# Patient Record
Sex: FEMALE | Race: WHITE | NOT HISPANIC OR LATINO | Employment: OTHER | URBAN - METROPOLITAN AREA
[De-identification: names, ages, dates, MRNs, and addresses within clinical notes are randomized per-mention and may not be internally consistent; named-entity substitution may affect disease eponyms.]

---

## 2024-01-17 DIAGNOSIS — F10.21 CHRONIC ALCOHOLISM IN REMISSION (MULTI): ICD-10-CM

## 2024-01-17 RX ORDER — BUPROPION HYDROCHLORIDE 300 MG/1
300 TABLET ORAL DAILY
Qty: 90 TABLET | Refills: 0 | Status: SHIPPED | OUTPATIENT
Start: 2024-01-17 | End: 2024-04-16

## 2024-02-07 ENCOUNTER — TELEPHONE (OUTPATIENT)
Dept: PRIMARY CARE | Facility: CLINIC | Age: 69
End: 2024-02-07
Payer: MEDICARE

## 2024-02-07 DIAGNOSIS — N95.1 MENOPAUSAL SYMPTOM: ICD-10-CM

## 2024-02-07 PROBLEM — F32.A DEPRESSION: Status: ACTIVE | Noted: 2024-02-07

## 2024-02-07 PROBLEM — R79.89 ABNORMAL THYROID BLOOD TEST: Status: ACTIVE | Noted: 2021-09-10

## 2024-02-07 PROBLEM — R79.89 ABNORMAL TSH: Status: ACTIVE | Noted: 2023-05-17

## 2024-02-07 PROBLEM — M62.838 MUSCLE SPASM: Status: ACTIVE | Noted: 2019-02-28

## 2024-02-07 PROBLEM — E55.9 VITAMIN D DEFICIENCY: Status: ACTIVE | Noted: 2024-02-07

## 2024-02-07 RX ORDER — ESTRADIOL 0.04 MG/D
FILM, EXTENDED RELEASE TRANSDERMAL
COMMUNITY
Start: 2013-03-15 | End: 2024-02-07 | Stop reason: SDUPTHER

## 2024-02-07 NOTE — TELEPHONE ENCOUNTER
Rx Refill Request Telephone Encounter    Name:  Cynthia Garza Susan  :  [id number]  Medication Name:  Estradiol patch             Specific Pharmacy location:  [facility], [place]  Date of last appointment:    Date of next appointment:    Best number to reach patient:

## 2024-02-08 RX ORDER — ESTRADIOL 0.04 MG/D
FILM, EXTENDED RELEASE TRANSDERMAL
Qty: 8 PATCH | Refills: 3 | Status: SHIPPED | OUTPATIENT
Start: 2024-02-08 | End: 2024-04-23 | Stop reason: SDUPTHER

## 2024-04-13 DIAGNOSIS — F10.21 CHRONIC ALCOHOLISM IN REMISSION (MULTI): ICD-10-CM

## 2024-04-16 RX ORDER — BUPROPION HYDROCHLORIDE 300 MG/1
300 TABLET ORAL DAILY
Qty: 90 TABLET | Refills: 0 | Status: SHIPPED | OUTPATIENT
Start: 2024-04-16 | End: 2024-05-14 | Stop reason: SDUPTHER

## 2024-04-23 ENCOUNTER — TELEPHONE (OUTPATIENT)
Dept: PRIMARY CARE | Facility: CLINIC | Age: 69
End: 2024-04-23

## 2024-04-23 ENCOUNTER — OFFICE VISIT (OUTPATIENT)
Dept: PRIMARY CARE | Facility: CLINIC | Age: 69
End: 2024-04-23
Payer: MEDICARE

## 2024-04-23 VITALS
HEIGHT: 65 IN | BODY MASS INDEX: 25.33 KG/M2 | DIASTOLIC BLOOD PRESSURE: 68 MMHG | OXYGEN SATURATION: 98 % | SYSTOLIC BLOOD PRESSURE: 112 MMHG | WEIGHT: 152 LBS | HEART RATE: 70 BPM

## 2024-04-23 DIAGNOSIS — Z78.0 POSTMENOPAUSAL STATUS: ICD-10-CM

## 2024-04-23 DIAGNOSIS — Z00.00 ROUTINE GENERAL MEDICAL EXAMINATION AT HEALTH CARE FACILITY: Primary | ICD-10-CM

## 2024-04-23 DIAGNOSIS — F32.A DEPRESSION, UNSPECIFIED DEPRESSION TYPE: ICD-10-CM

## 2024-04-23 DIAGNOSIS — Z00.00 ANNUAL PHYSICAL EXAM: ICD-10-CM

## 2024-04-23 DIAGNOSIS — R79.89 ABNORMAL THYROID BLOOD TEST: ICD-10-CM

## 2024-04-23 DIAGNOSIS — R53.83 OTHER FATIGUE: ICD-10-CM

## 2024-04-23 DIAGNOSIS — Z12.31 ENCOUNTER FOR SCREENING MAMMOGRAM FOR BREAST CANCER: ICD-10-CM

## 2024-04-23 DIAGNOSIS — Z00.00 ANNUAL PHYSICAL EXAM: Primary | ICD-10-CM

## 2024-04-23 DIAGNOSIS — R79.89 ABNORMAL TSH: ICD-10-CM

## 2024-04-23 DIAGNOSIS — N95.1 MENOPAUSAL SYMPTOM: ICD-10-CM

## 2024-04-23 PROCEDURE — 1126F AMNT PAIN NOTED NONE PRSNT: CPT | Performed by: PHYSICIAN ASSISTANT

## 2024-04-23 PROCEDURE — 1160F RVW MEDS BY RX/DR IN RCRD: CPT | Performed by: PHYSICIAN ASSISTANT

## 2024-04-23 PROCEDURE — 1159F MED LIST DOCD IN RCRD: CPT | Performed by: PHYSICIAN ASSISTANT

## 2024-04-23 PROCEDURE — 1170F FXNL STATUS ASSESSED: CPT | Performed by: PHYSICIAN ASSISTANT

## 2024-04-23 PROCEDURE — G0439 PPPS, SUBSEQ VISIT: HCPCS | Performed by: PHYSICIAN ASSISTANT

## 2024-04-23 RX ORDER — ESTRADIOL 0.04 MG/D
FILM, EXTENDED RELEASE TRANSDERMAL
Qty: 8 PATCH | Refills: 3 | Status: SHIPPED | OUTPATIENT
Start: 2024-04-23

## 2024-04-23 RX ORDER — ESTRADIOL 0.04 MG/D
FILM, EXTENDED RELEASE TRANSDERMAL
Qty: 8 PATCH | Refills: 3 | Status: SHIPPED | OUTPATIENT
Start: 2024-04-23 | End: 2024-04-23 | Stop reason: SDUPTHER

## 2024-04-23 RX ORDER — ERGOCALCIFEROL 1.25 MG/1
CAPSULE ORAL
COMMUNITY
Start: 2023-01-11

## 2024-04-23 ASSESSMENT — PATIENT HEALTH QUESTIONNAIRE - PHQ9
SUM OF ALL RESPONSES TO PHQ9 QUESTIONS 1 AND 2: 0
1. LITTLE INTEREST OR PLEASURE IN DOING THINGS: NOT AT ALL
2. FEELING DOWN, DEPRESSED OR HOPELESS: NOT AT ALL

## 2024-04-23 ASSESSMENT — ACTIVITIES OF DAILY LIVING (ADL)
BATHING: INDEPENDENT
DRESSING: INDEPENDENT
DOING_HOUSEWORK: INDEPENDENT
TAKING_MEDICATION: INDEPENDENT
GROCERY_SHOPPING: INDEPENDENT
MANAGING_FINANCES: INDEPENDENT

## 2024-04-23 ASSESSMENT — ENCOUNTER SYMPTOMS
OCCASIONAL FEELINGS OF UNSTEADINESS: 0
DEPRESSION: 0
LOSS OF SENSATION IN FEET: 0

## 2024-04-23 ASSESSMENT — PAIN SCALES - GENERAL: PAINLEVEL: 0-NO PAIN

## 2024-04-23 NOTE — PROGRESS NOTES
"Subjective   Reason for Visit: Cynthia Aranza Davis is an 68 y.o. female here for a Medicare Wellness visit.     Past Medical, Surgical, and Family History reviewed and updated in chart.         Depression  Visit Type: follow-up  Patient presents with the following symptoms: depressed mood, excessive worry and restlessness.  Patient is not experiencing: nervousness/anxiety, palpitations and shortness of breath.      Worried about her grandson who graduated from high school and is not always making good decisions.    Patient Care Team:  Cynthia Garcia PA-C as PCP - General (Family Medicine)     Review of Systems   Constitutional:  Negative for activity change and appetite change.   HENT:  Negative for dental problem.    Eyes:  Negative for visual disturbance.   Respiratory:  Negative for chest tightness, shortness of breath and wheezing.    Cardiovascular:  Negative for chest pain, palpitations and leg swelling.   Gastrointestinal:  Negative for abdominal pain, blood in stool, constipation, diarrhea and nausea.   Endocrine: Negative for cold intolerance and heat intolerance.   Genitourinary:  Negative for frequency and urgency.   Musculoskeletal:  Negative for arthralgias and myalgias.   Skin:  Negative for color change.   Allergic/Immunologic: Negative for immunocompromised state.   Neurological:  Negative for dizziness, tremors and light-headedness.   Psychiatric/Behavioral:  Positive for behavioral problems and depression. Negative for sleep disturbance. The patient is not nervous/anxious.        Objective   Vitals:  /68 (BP Location: Left arm, Patient Position: Sitting)   Pulse 70   Ht 1.651 m (5' 5\")   Wt 68.9 kg (152 lb)   SpO2 98%   BMI 25.29 kg/m²       Physical Exam  HENT:      Right Ear: Tympanic membrane normal.      Left Ear: Tympanic membrane normal.      Nose: Nose normal.      Mouth/Throat:      Mouth: Mucous membranes are moist.   Cardiovascular:      Rate and Rhythm: Normal " rate and regular rhythm.      Pulses: Normal pulses.   Pulmonary:      Effort: Pulmonary effort is normal. No respiratory distress.   Abdominal:      General: Bowel sounds are normal.      Tenderness: There is no abdominal tenderness.   Musculoskeletal:      Cervical back: Normal range of motion. No tenderness.      Right lower leg: No edema.      Left lower leg: No edema.   Skin:     General: Skin is warm.   Neurological:      General: No focal deficit present.      Mental Status: She is alert. Mental status is at baseline.   Psychiatric:         Mood and Affect: Mood normal.         Thought Content: Thought content normal.         Judgment: Judgment normal.         Assessment/Plan   Problem List Items Addressed This Visit       Menopausal symptom    Relevant Medications    estradiol (Vivelle-DOT) 0.0375 mg/24 hr     Other Visit Diagnoses       Routine general medical examination at health care facility    -  Primary    Postmenopausal status        Relevant Medications    estradiol (Vivelle-DOT) 0.0375 mg/24 hr    Other Relevant Orders    XR DEXA bone density    Encounter for screening mammogram for breast cancer        Relevant Orders    BI mammo bilateral screening tomosynthesis    Annual physical exam        Relevant Orders    CBC and Auto Differential    Comprehensive Metabolic Panel    Lipid Panel    TSH with reflex to Free T4 if abnormal    Urinalysis with Reflex Microscopic    Other fatigue        Relevant Orders    CBC and Auto Differential        Discussed eventually getting off the estradiol patch. She will try to decrease use.

## 2024-04-23 NOTE — TELEPHONE ENCOUNTER
Patient would like a paper copy of the prescriptions she was getting from today's appt.  She would like to be called when they are ready.  [phone]

## 2024-04-28 ASSESSMENT — ENCOUNTER SYMPTOMS
RESTLESSNESS: 1
PALPITATIONS: 0
COLOR CHANGE: 0
ACTIVITY CHANGE: 0
LIGHT-HEADEDNESS: 0
DIARRHEA: 0
ARTHRALGIAS: 0
CHEST TIGHTNESS: 0
DIZZINESS: 0
CONSTIPATION: 0
FREQUENCY: 0
NERVOUS/ANXIOUS: 0
SHORTNESS OF BREATH: 0
ABDOMINAL PAIN: 0
TREMORS: 0
DEPRESSION: 1
BLOOD IN STOOL: 0
DEPRESSED MOOD: 1
SLEEP DISTURBANCE: 0
NAUSEA: 0
APPETITE CHANGE: 0
WHEEZING: 0
MYALGIAS: 0

## 2024-04-29 ENCOUNTER — HOSPITAL ENCOUNTER (OUTPATIENT)
Dept: RADIOLOGY | Facility: CLINIC | Age: 69
Discharge: HOME | End: 2024-04-29
Payer: MEDICARE

## 2024-04-29 ENCOUNTER — LAB (OUTPATIENT)
Dept: LAB | Facility: LAB | Age: 69
End: 2024-04-29
Payer: MEDICARE

## 2024-04-29 VITALS — BODY MASS INDEX: 24.11 KG/M2 | WEIGHT: 150 LBS | HEIGHT: 66 IN

## 2024-04-29 DIAGNOSIS — R53.83 OTHER FATIGUE: ICD-10-CM

## 2024-04-29 DIAGNOSIS — R79.89 ABNORMAL TSH: ICD-10-CM

## 2024-04-29 DIAGNOSIS — Z78.0 POSTMENOPAUSAL STATUS: ICD-10-CM

## 2024-04-29 DIAGNOSIS — Z12.31 ENCOUNTER FOR SCREENING MAMMOGRAM FOR BREAST CANCER: ICD-10-CM

## 2024-04-29 DIAGNOSIS — F32.A DEPRESSION, UNSPECIFIED DEPRESSION TYPE: ICD-10-CM

## 2024-04-29 DIAGNOSIS — Z00.00 ANNUAL PHYSICAL EXAM: ICD-10-CM

## 2024-04-29 LAB
ALBUMIN SERPL-MCNC: 4.3 G/DL (ref 3.5–5)
ALP BLD-CCNC: 66 U/L (ref 35–125)
ALT SERPL-CCNC: 11 U/L (ref 5–40)
ANION GAP SERPL CALC-SCNC: 10 MMOL/L
APPEARANCE UR: ABNORMAL
AST SERPL-CCNC: 14 U/L (ref 5–40)
BASOPHILS # BLD AUTO: 0.04 X10*3/UL (ref 0–0.1)
BASOPHILS NFR BLD AUTO: 0.7 %
BILIRUB SERPL-MCNC: 0.3 MG/DL (ref 0.1–1.2)
BILIRUB UR STRIP.AUTO-MCNC: NEGATIVE MG/DL
BUN SERPL-MCNC: 17 MG/DL (ref 8–25)
CALCIUM SERPL-MCNC: 8.8 MG/DL (ref 8.5–10.4)
CHLORIDE SERPL-SCNC: 106 MMOL/L (ref 97–107)
CHOLEST SERPL-MCNC: 225 MG/DL (ref 133–200)
CHOLEST/HDLC SERPL: 3.3 {RATIO}
CO2 SERPL-SCNC: 27 MMOL/L (ref 24–31)
COLOR UR: ABNORMAL
CREAT SERPL-MCNC: 0.8 MG/DL (ref 0.4–1.6)
EGFRCR SERPLBLD CKD-EPI 2021: 81 ML/MIN/1.73M*2
EOSINOPHIL # BLD AUTO: 0.09 X10*3/UL (ref 0–0.7)
EOSINOPHIL NFR BLD AUTO: 1.6 %
ERYTHROCYTE [DISTWIDTH] IN BLOOD BY AUTOMATED COUNT: 13.1 % (ref 11.5–14.5)
GLUCOSE SERPL-MCNC: 95 MG/DL (ref 65–99)
GLUCOSE UR STRIP.AUTO-MCNC: NORMAL MG/DL
HCT VFR BLD AUTO: 40.8 % (ref 36–46)
HDLC SERPL-MCNC: 68 MG/DL
HGB BLD-MCNC: 12.9 G/DL (ref 12–16)
IMM GRANULOCYTES # BLD AUTO: 0.01 X10*3/UL (ref 0–0.7)
IMM GRANULOCYTES NFR BLD AUTO: 0.2 % (ref 0–0.9)
KETONES UR STRIP.AUTO-MCNC: NEGATIVE MG/DL
LDLC SERPL CALC-MCNC: 142 MG/DL (ref 65–130)
LEUKOCYTE ESTERASE UR QL STRIP.AUTO: NEGATIVE
LYMPHOCYTES # BLD AUTO: 2.47 X10*3/UL (ref 1.2–4.8)
LYMPHOCYTES NFR BLD AUTO: 44.2 %
MCH RBC QN AUTO: 31.5 PG (ref 26–34)
MCHC RBC AUTO-ENTMCNC: 31.6 G/DL (ref 32–36)
MCV RBC AUTO: 100 FL (ref 80–100)
MONOCYTES # BLD AUTO: 0.44 X10*3/UL (ref 0.1–1)
MONOCYTES NFR BLD AUTO: 7.9 %
NEUTROPHILS # BLD AUTO: 2.54 X10*3/UL (ref 1.2–7.7)
NEUTROPHILS NFR BLD AUTO: 45.4 %
NITRITE UR QL STRIP.AUTO: NEGATIVE
NRBC BLD-RTO: 0 /100 WBCS (ref 0–0)
PH UR STRIP.AUTO: 5.5 [PH]
PLATELET # BLD AUTO: 198 X10*3/UL (ref 150–450)
POTASSIUM SERPL-SCNC: 4 MMOL/L (ref 3.4–5.1)
PROT SERPL-MCNC: 6.3 G/DL (ref 5.9–7.9)
PROT UR STRIP.AUTO-MCNC: NEGATIVE MG/DL
RBC # BLD AUTO: 4.1 X10*6/UL (ref 4–5.2)
RBC # UR STRIP.AUTO: NEGATIVE /UL
SODIUM SERPL-SCNC: 143 MMOL/L (ref 133–145)
SP GR UR STRIP.AUTO: 1.02
T4 FREE SERPL-MCNC: 0.9 NG/DL (ref 0.9–1.7)
TRIGL SERPL-MCNC: 75 MG/DL (ref 40–150)
TSH SERPL DL<=0.05 MIU/L-ACNC: 4.73 MIU/L (ref 0.27–4.2)
UROBILINOGEN UR STRIP.AUTO-MCNC: NORMAL MG/DL
WBC # BLD AUTO: 5.6 X10*3/UL (ref 4.4–11.3)

## 2024-04-29 PROCEDURE — 77067 SCR MAMMO BI INCL CAD: CPT

## 2024-04-29 PROCEDURE — 81003 URINALYSIS AUTO W/O SCOPE: CPT

## 2024-04-29 PROCEDURE — 36415 COLL VENOUS BLD VENIPUNCTURE: CPT

## 2024-04-29 PROCEDURE — 85025 COMPLETE CBC W/AUTO DIFF WBC: CPT

## 2024-04-29 PROCEDURE — 80061 LIPID PANEL: CPT

## 2024-04-29 PROCEDURE — 77080 DXA BONE DENSITY AXIAL: CPT

## 2024-04-29 PROCEDURE — 77080 DXA BONE DENSITY AXIAL: CPT | Performed by: RADIOLOGY

## 2024-04-29 PROCEDURE — 77063 BREAST TOMOSYNTHESIS BI: CPT | Performed by: RADIOLOGY

## 2024-04-29 PROCEDURE — 84443 ASSAY THYROID STIM HORMONE: CPT

## 2024-04-29 PROCEDURE — 84439 ASSAY OF FREE THYROXINE: CPT

## 2024-04-29 PROCEDURE — 80053 COMPREHEN METABOLIC PANEL: CPT

## 2024-04-29 PROCEDURE — 77067 SCR MAMMO BI INCL CAD: CPT | Performed by: RADIOLOGY

## 2024-05-14 ENCOUNTER — TELEPHONE (OUTPATIENT)
Dept: PRIMARY CARE | Facility: CLINIC | Age: 69
End: 2024-05-14
Payer: MEDICARE

## 2024-05-14 DIAGNOSIS — F10.21 CHRONIC ALCOHOLISM IN REMISSION (MULTI): ICD-10-CM

## 2024-05-14 RX ORDER — BUPROPION HYDROCHLORIDE 300 MG/1
300 TABLET ORAL DAILY
Qty: 90 TABLET | Refills: 0 | Status: SHIPPED | OUTPATIENT
Start: 2024-05-14

## 2024-05-14 NOTE — TELEPHONE ENCOUNTER
Patient would like to  a paper script   For the following:     buPROPion XL (Wellbutrin XL) 300mg 24 hr tablet     [phone]

## 2024-06-17 ENCOUNTER — OFFICE VISIT (OUTPATIENT)
Dept: PRIMARY CARE | Facility: CLINIC | Age: 69
End: 2024-06-17
Payer: MEDICARE

## 2024-06-17 VITALS
DIASTOLIC BLOOD PRESSURE: 72 MMHG | BODY MASS INDEX: 24.53 KG/M2 | SYSTOLIC BLOOD PRESSURE: 112 MMHG | TEMPERATURE: 97.5 F | OXYGEN SATURATION: 98 % | HEART RATE: 78 BPM | WEIGHT: 152 LBS

## 2024-06-17 DIAGNOSIS — J02.0 STREP PHARYNGITIS: Primary | ICD-10-CM

## 2024-06-17 DIAGNOSIS — B37.31 YEAST VAGINITIS: ICD-10-CM

## 2024-06-17 DIAGNOSIS — J02.9 SORE THROAT: ICD-10-CM

## 2024-06-17 LAB — POC RAPID STREP: POSITIVE

## 2024-06-17 PROCEDURE — 1036F TOBACCO NON-USER: CPT | Performed by: PHYSICIAN ASSISTANT

## 2024-06-17 PROCEDURE — 1160F RVW MEDS BY RX/DR IN RCRD: CPT | Performed by: PHYSICIAN ASSISTANT

## 2024-06-17 PROCEDURE — 1158F ADVNC CARE PLAN TLK DOCD: CPT | Performed by: PHYSICIAN ASSISTANT

## 2024-06-17 PROCEDURE — 1159F MED LIST DOCD IN RCRD: CPT | Performed by: PHYSICIAN ASSISTANT

## 2024-06-17 PROCEDURE — 1123F ACP DISCUSS/DSCN MKR DOCD: CPT | Performed by: PHYSICIAN ASSISTANT

## 2024-06-17 PROCEDURE — 99213 OFFICE O/P EST LOW 20 MIN: CPT | Performed by: PHYSICIAN ASSISTANT

## 2024-06-17 PROCEDURE — 87880 STREP A ASSAY W/OPTIC: CPT | Performed by: PHYSICIAN ASSISTANT

## 2024-06-17 PROCEDURE — 1125F AMNT PAIN NOTED PAIN PRSNT: CPT | Performed by: PHYSICIAN ASSISTANT

## 2024-06-17 RX ORDER — PENICILLIN V POTASSIUM 500 MG/1
500 TABLET, FILM COATED ORAL 2 TIMES DAILY
Qty: 20 TABLET | Refills: 0 | Status: SHIPPED | OUTPATIENT
Start: 2024-06-17 | End: 2024-06-27

## 2024-06-17 RX ORDER — FLUCONAZOLE 150 MG/1
150 TABLET ORAL ONCE
Qty: 1 TABLET | Refills: 0 | Status: SHIPPED | OUTPATIENT
Start: 2024-06-17 | End: 2024-06-17

## 2024-06-17 ASSESSMENT — ENCOUNTER SYMPTOMS
SORE THROAT: 1
SWOLLEN GLANDS: 1
TROUBLE SWALLOWING: 1
COUGH: 0

## 2024-06-17 ASSESSMENT — PAIN SCALES - GENERAL: PAINLEVEL: 7

## 2024-06-17 NOTE — PROGRESS NOTES
Subjective   Patient ID: Cynthia Davis is a 68 y.o. female who presents for Sore Throat (Began yesterday, taking ibuprofen. Daughter and son in law had strep recently.).    Sore Throat   This is a new problem. The current episode started yesterday. The problem has been gradually worsening. There has been no fever. Associated symptoms include swollen glands and trouble swallowing. Pertinent negatives include no congestion, coughing or drooling. She has had exposure to strep.        Review of Systems   HENT:  Positive for sore throat and trouble swallowing. Negative for congestion and drooling.    Respiratory:  Negative for cough.        Objective   /72   Pulse 78   Temp 36.4 °C (97.5 °F)   Wt 68.9 kg (152 lb)   SpO2 98%   BMI 24.53 kg/m²     Physical Exam  HENT:      Left Ear: Tympanic membrane normal.      Nose: Nose normal.      Mouth/Throat:      Mouth: Mucous membranes are moist.      Pharynx: Posterior oropharyngeal erythema present. No oropharyngeal exudate.   Eyes:      Extraocular Movements: Extraocular movements intact.      Pupils: Pupils are equal, round, and reactive to light.   Musculoskeletal:      Cervical back: Neck supple.   Lymphadenopathy:      Cervical: Cervical adenopathy present.   Skin:     General: Skin is warm.      Findings: No rash.   Neurological:      General: No focal deficit present.      Mental Status: She is alert and oriented to person, place, and time. Mental status is at baseline.   Psychiatric:         Mood and Affect: Mood normal.         Behavior: Behavior normal.         Thought Content: Thought content normal.         Judgment: Judgment normal.         Assessment/Plan   Diagnoses and all orders for this visit:  Strep pharyngitis  -     penicillin v potassium (Veetid) 500 mg tablet; Take 1 tablet (500 mg) by mouth 2 times a day for 10 days.  Sore throat  -     POCT Rapid Strep A manually resulted  Yeast vaginitis  -     fluconazole (Diflucan) 150 mg  tablet; Take 1 tablet (150 mg) by mouth 1 time for 1 dose.    Follow up  prn

## 2024-09-12 ENCOUNTER — TELEPHONE (OUTPATIENT)
Dept: PRIMARY CARE | Facility: CLINIC | Age: 69
End: 2024-09-12
Payer: MEDICARE

## 2024-09-12 DIAGNOSIS — R94.6 ABNORMAL THYROID FUNCTION TEST: ICD-10-CM

## 2024-09-12 DIAGNOSIS — N95.1 MENOPAUSAL SYMPTOM: ICD-10-CM

## 2024-09-12 DIAGNOSIS — Z78.0 POSTMENOPAUSAL STATUS: ICD-10-CM

## 2024-09-12 DIAGNOSIS — R79.89 ABNORMAL THYROID BLOOD TEST: ICD-10-CM

## 2024-09-12 RX ORDER — ESTRADIOL 0.04 MG/D
FILM, EXTENDED RELEASE TRANSDERMAL
Qty: 8 PATCH | Refills: 3 | Status: SHIPPED | OUTPATIENT
Start: 2024-09-12

## 2024-09-12 NOTE — TELEPHONE ENCOUNTER
Patient called  [phone] she could not remember if she was suppose to schedule a TSH or kidney check    Also needs a refill on Estradiol patch, [facility] [place]

## 2024-09-30 ENCOUNTER — LAB (OUTPATIENT)
Dept: LAB | Facility: LAB | Age: 69
End: 2024-09-30
Payer: MEDICARE

## 2024-09-30 DIAGNOSIS — R94.6 ABNORMAL THYROID FUNCTION TEST: ICD-10-CM

## 2024-09-30 DIAGNOSIS — R79.89 ABNORMAL THYROID BLOOD TEST: ICD-10-CM

## 2024-09-30 LAB — TSH SERPL-ACNC: 3.08 MIU/L (ref 0.44–3.98)

## 2024-09-30 PROCEDURE — 84443 ASSAY THYROID STIM HORMONE: CPT

## 2024-09-30 PROCEDURE — 36415 COLL VENOUS BLD VENIPUNCTURE: CPT

## 2024-10-01 ENCOUNTER — TELEPHONE (OUTPATIENT)
Dept: PRIMARY CARE | Facility: CLINIC | Age: 69
End: 2024-10-01
Payer: MEDICARE

## 2024-10-01 ENCOUNTER — PATIENT MESSAGE (OUTPATIENT)
Dept: PRIMARY CARE | Facility: CLINIC | Age: 69
End: 2024-10-01

## 2024-10-01 NOTE — TELEPHONE ENCOUNTER
Patient would like to know why her thyroid levels are going up and down, 2nd or 3rd time it's gone up and then down 6 months later.    Patient can be reached at [phone]

## 2024-10-07 DIAGNOSIS — F10.21 CHRONIC ALCOHOLISM IN REMISSION (MULTI): ICD-10-CM

## 2024-10-07 RX ORDER — BUPROPION HYDROCHLORIDE 300 MG/1
300 TABLET ORAL DAILY
Qty: 90 TABLET | Refills: 1 | Status: SHIPPED | OUTPATIENT
Start: 2024-10-07

## 2024-10-07 NOTE — TELEPHONE ENCOUNTER
Patient called Rx line and requested a refill for Bupropion  mg - 90 day supply.  Pharmacy: [facility]

## 2024-10-08 ENCOUNTER — APPOINTMENT (OUTPATIENT)
Dept: RADIOLOGY | Facility: HOSPITAL | Age: 69
End: 2024-10-08
Payer: MEDICARE

## 2024-10-08 ENCOUNTER — HOSPITAL ENCOUNTER (EMERGENCY)
Facility: HOSPITAL | Age: 69
Discharge: HOME | End: 2024-10-08
Attending: EMERGENCY MEDICINE
Payer: MEDICARE

## 2024-10-08 VITALS
HEIGHT: 66 IN | SYSTOLIC BLOOD PRESSURE: 113 MMHG | HEART RATE: 66 BPM | DIASTOLIC BLOOD PRESSURE: 74 MMHG | BODY MASS INDEX: 24.8 KG/M2 | OXYGEN SATURATION: 98 % | WEIGHT: 154.32 LBS | TEMPERATURE: 98.1 F | RESPIRATION RATE: 17 BRPM

## 2024-10-08 DIAGNOSIS — I10 DIASTOLIC HYPERTENSION: ICD-10-CM

## 2024-10-08 DIAGNOSIS — R10.9 FLANK PAIN: Primary | ICD-10-CM

## 2024-10-08 LAB
ALBUMIN SERPL BCP-MCNC: 4.6 G/DL (ref 3.4–5)
ALP SERPL-CCNC: 55 U/L (ref 33–136)
ALT SERPL W P-5'-P-CCNC: 17 U/L (ref 7–45)
ANION GAP SERPL CALCULATED.3IONS-SCNC: 10 MMOL/L (ref 10–20)
APPEARANCE UR: CLEAR
AST SERPL W P-5'-P-CCNC: 17 U/L (ref 9–39)
BASOPHILS # BLD AUTO: 0.03 X10*3/UL (ref 0–0.1)
BASOPHILS NFR BLD AUTO: 0.5 %
BILIRUB SERPL-MCNC: 0.3 MG/DL (ref 0–1.2)
BILIRUB UR STRIP.AUTO-MCNC: NEGATIVE MG/DL
BUN SERPL-MCNC: 20 MG/DL (ref 6–23)
CALCIUM SERPL-MCNC: 9.5 MG/DL (ref 8.6–10.3)
CHLORIDE SERPL-SCNC: 102 MMOL/L (ref 98–107)
CO2 SERPL-SCNC: 29 MMOL/L (ref 21–32)
COLOR UR: NORMAL
CREAT SERPL-MCNC: 0.76 MG/DL (ref 0.5–1.05)
EGFRCR SERPLBLD CKD-EPI 2021: 87 ML/MIN/1.73M*2
EOSINOPHIL # BLD AUTO: 0.05 X10*3/UL (ref 0–0.7)
EOSINOPHIL NFR BLD AUTO: 0.9 %
ERYTHROCYTE [DISTWIDTH] IN BLOOD BY AUTOMATED COUNT: 12.6 % (ref 11.5–14.5)
GLUCOSE SERPL-MCNC: 92 MG/DL (ref 74–99)
GLUCOSE UR STRIP.AUTO-MCNC: NORMAL MG/DL
HCT VFR BLD AUTO: 42.3 % (ref 36–46)
HGB BLD-MCNC: 13.7 G/DL (ref 12–16)
IMM GRANULOCYTES # BLD AUTO: 0.02 X10*3/UL (ref 0–0.7)
IMM GRANULOCYTES NFR BLD AUTO: 0.4 % (ref 0–0.9)
KETONES UR STRIP.AUTO-MCNC: NEGATIVE MG/DL
LEUKOCYTE ESTERASE UR QL STRIP.AUTO: NEGATIVE
LYMPHOCYTES # BLD AUTO: 1.77 X10*3/UL (ref 1.2–4.8)
LYMPHOCYTES NFR BLD AUTO: 32.2 %
MCH RBC QN AUTO: 31.4 PG (ref 26–34)
MCHC RBC AUTO-ENTMCNC: 32.4 G/DL (ref 32–36)
MCV RBC AUTO: 97 FL (ref 80–100)
MONOCYTES # BLD AUTO: 0.44 X10*3/UL (ref 0.1–1)
MONOCYTES NFR BLD AUTO: 8 %
NEUTROPHILS # BLD AUTO: 3.18 X10*3/UL (ref 1.2–7.7)
NEUTROPHILS NFR BLD AUTO: 58 %
NITRITE UR QL STRIP.AUTO: NEGATIVE
NRBC BLD-RTO: 0 /100 WBCS (ref 0–0)
PH UR STRIP.AUTO: 5.5 [PH]
PLATELET # BLD AUTO: 192 X10*3/UL (ref 150–450)
POTASSIUM SERPL-SCNC: 4.3 MMOL/L (ref 3.5–5.3)
PROT SERPL-MCNC: 6.9 G/DL (ref 6.4–8.2)
PROT UR STRIP.AUTO-MCNC: NEGATIVE MG/DL
RBC # BLD AUTO: 4.36 X10*6/UL (ref 4–5.2)
RBC # UR STRIP.AUTO: NEGATIVE /UL
SODIUM SERPL-SCNC: 137 MMOL/L (ref 136–145)
SP GR UR STRIP.AUTO: 1.01
UROBILINOGEN UR STRIP.AUTO-MCNC: NORMAL MG/DL
WBC # BLD AUTO: 5.5 X10*3/UL (ref 4.4–11.3)

## 2024-10-08 PROCEDURE — 85025 COMPLETE CBC W/AUTO DIFF WBC: CPT | Performed by: EMERGENCY MEDICINE

## 2024-10-08 PROCEDURE — 36415 COLL VENOUS BLD VENIPUNCTURE: CPT | Performed by: EMERGENCY MEDICINE

## 2024-10-08 PROCEDURE — 2500000004 HC RX 250 GENERAL PHARMACY W/ HCPCS (ALT 636 FOR OP/ED): Performed by: EMERGENCY MEDICINE

## 2024-10-08 PROCEDURE — 74176 CT ABD & PELVIS W/O CONTRAST: CPT | Performed by: RADIOLOGY

## 2024-10-08 PROCEDURE — 74176 CT ABD & PELVIS W/O CONTRAST: CPT

## 2024-10-08 PROCEDURE — 81003 URINALYSIS AUTO W/O SCOPE: CPT | Performed by: EMERGENCY MEDICINE

## 2024-10-08 PROCEDURE — 84075 ASSAY ALKALINE PHOSPHATASE: CPT | Performed by: EMERGENCY MEDICINE

## 2024-10-08 PROCEDURE — 99284 EMERGENCY DEPT VISIT MOD MDM: CPT | Mod: 25

## 2024-10-08 PROCEDURE — 96361 HYDRATE IV INFUSION ADD-ON: CPT

## 2024-10-08 PROCEDURE — 96374 THER/PROPH/DIAG INJ IV PUSH: CPT

## 2024-10-08 RX ORDER — IBUPROFEN 600 MG/1
600 TABLET ORAL EVERY 6 HOURS PRN
Qty: 20 TABLET | Refills: 0 | Status: SHIPPED | OUTPATIENT
Start: 2024-10-08 | End: 2024-10-13

## 2024-10-08 RX ORDER — ACETAMINOPHEN 325 MG/1
975 TABLET ORAL ONCE
Status: DISCONTINUED | OUTPATIENT
Start: 2024-10-08 | End: 2024-10-08 | Stop reason: HOSPADM

## 2024-10-08 RX ORDER — KETOROLAC TROMETHAMINE 30 MG/ML
15 INJECTION, SOLUTION INTRAMUSCULAR; INTRAVENOUS ONCE
Status: COMPLETED | OUTPATIENT
Start: 2024-10-08 | End: 2024-10-08

## 2024-10-08 RX ORDER — METHOCARBAMOL 500 MG/1
500 TABLET, FILM COATED ORAL 3 TIMES DAILY
Qty: 15 TABLET | Refills: 0 | Status: SHIPPED | OUTPATIENT
Start: 2024-10-08 | End: 2024-10-13

## 2024-10-08 ASSESSMENT — COLUMBIA-SUICIDE SEVERITY RATING SCALE - C-SSRS
2. HAVE YOU ACTUALLY HAD ANY THOUGHTS OF KILLING YOURSELF?: NO
6. HAVE YOU EVER DONE ANYTHING, STARTED TO DO ANYTHING, OR PREPARED TO DO ANYTHING TO END YOUR LIFE?: NO
1. IN THE PAST MONTH, HAVE YOU WISHED YOU WERE DEAD OR WISHED YOU COULD GO TO SLEEP AND NOT WAKE UP?: NO

## 2024-10-08 ASSESSMENT — PAIN - FUNCTIONAL ASSESSMENT
PAIN_FUNCTIONAL_ASSESSMENT: 0-10
PAIN_FUNCTIONAL_ASSESSMENT: 0-10

## 2024-10-08 ASSESSMENT — PAIN SCALES - GENERAL
PAINLEVEL_OUTOF10: 4
PAINLEVEL_OUTOF10: 6
PAINLEVEL_OUTOF10: 6

## 2024-10-08 ASSESSMENT — PAIN DESCRIPTION - PROGRESSION: CLINICAL_PROGRESSION: NOT CHANGED

## 2024-10-08 ASSESSMENT — PAIN DESCRIPTION - ORIENTATION: ORIENTATION: LEFT;LOWER

## 2024-10-08 ASSESSMENT — PAIN DESCRIPTION - LOCATION: LOCATION: BACK

## 2024-10-08 NOTE — Clinical Note
Cynthia Davis was seen and treated in our emergency department on 10/8/2024.  She may return to work on 10/10/2024.       If you have any questions or concerns, please don't hesitate to call.      Laine Flores MD

## 2024-10-08 NOTE — ED PROVIDER NOTES
HPI   Chief Complaint   Patient presents with    Back Pain     Pt complains of intermittent left lower back pain x2 weeks.  Worse today.       HPI        Patient History   Past Medical History:   Diagnosis Date    Chronic alcohol use     Degenerative scoliosis     HLA B27 (HLA B27 positive)      Past Surgical History:   Procedure Laterality Date    BREAST BIOPSY Right     HYSTERECTOMY       Family History   Problem Relation Name Age of Onset    Dementia Mother      Breast cancer Father's Sister  50 - 59     Social History     Tobacco Use    Smoking status: Former     Types: Cigarettes    Smokeless tobacco: Never   Vaping Use    Vaping status: Never Used   Substance Use Topics    Alcohol use: Not Currently    Drug use: Never       Physical Exam   ED Triage Vitals [10/08/24 0829]   Temperature Heart Rate Respirations BP   36.7 °C (98.1 °F) 73 16 117/84      Pulse Ox Temp src Heart Rate Source Patient Position   99 % -- -- --      BP Location FiO2 (%)     -- --       Physical Exam      ED Course & MDM   Diagnoses as of 10/08/24 1820   Flank pain   Diastolic hypertension                 No data recorded     [place] Coma Scale Score: 15 (10/08/24 0829 : Madhu Rider, EMT)                           Medical Decision Making    The patient is a 68-year-old female presenting to the emergency department for evaluation of left-sided flank pain.  She states that she has had symptoms for the past 2 weeks.  No recent injury or trauma.  She states that it is somewhat improved with a heating pad.  No specific pain with movement/bending/twisting.  It is a dull aching pain.  It is fairly constant but gradually worsening.  She states it woke her from sleep this morning.  She denies any weakness or numbness.  No headache or visual changes.  No midline neck or back pain.  No chest pain or shortness of breath.  No abdominal pain.  No nausea or vomiting.  No diarrhea or constipation.  No urinary complaints.  No vaginal discharge.  All  pertinent positives and negatives are recorded above.  All other systems reviewed and otherwise negative.  Vital signs with diastolic hypertension but otherwise within normal limits.  Physical exam with a well-nourished well-developed female in no acute distress.  HEENT exam within normal limits.  She has no evidence of airway compromise or respiratory distress.  Abdominal exam is benign.  She does not have any focal midline neck or back pain with palpation.  No step-offs.  Strength is 5 out of 5 in all 4 extremities.  Sensation is intact.  She is able to walk and stand without difficulty.      Oral acetaminophen, IV Toradol, IV fluids ordered      Diagnostic labs without significant abnormality      CT abdomen pelvis wo IV contrast   Final Result   1. No acute process within the abdomen or pelvis.   2. Mild prominence of the appendix with a diameter of 1 cm. No   surrounding inflammatory changes. Location is standard. Clinical   correlation recommended.   3. Levocurvature of the thoracolumbar spine.        MACRO:   None.        Signed by: Ananda Randall 10/8/2024 9:32 AM   Dictation workstation:   [id number]           The patient does not have any evidence of airway compromise or respiratory distress on exam but she does have a benign abdominal exam.  Diagnostic labs within normal limits.  She has no evidence of sepsis.  The CT abdomen pelvis shows no evidence of ureteral lithiasis, cholecystitis, diverticulitis, or pancreatitis.  There is a mild prominence of the appendix with a diameter over 1 cm but there is no surrounding inflammatory changes.  She also has a benign abdominal exam making this less likely to be an acute appendicitis given that she has had 2 weeks of symptoms in the left flank but no abdominal pain.      The patient was given a prescription for ibuprofen and Robaxin to treat for possible muscular strain and was instructed to follow-up with her primary care physician within 1 to 2 days for further  management of her current symptoms and repeat check of her blood pressure.  She will return to the emergency department sooner with worsening of symptoms or onset of new symptoms.      Impression/diagnosis  Left-sided flank pain  Diastolic hypertension      I reviewed the results of the diagnostic labs and diagnostic imaging.  Formal radiology reading was completed by the radiologist    Procedure  Procedures     Laine Flores MD  10/08/24 3023       Laine Flores MD  10/08/24 4928

## 2025-01-06 ENCOUNTER — TELEPHONE (OUTPATIENT)
Dept: PRIMARY CARE | Facility: CLINIC | Age: 70
End: 2025-01-06
Payer: MEDICARE

## 2025-01-06 DIAGNOSIS — Z78.0 POSTMENOPAUSAL STATUS: ICD-10-CM

## 2025-01-06 DIAGNOSIS — N95.1 MENOPAUSAL SYMPTOM: ICD-10-CM

## 2025-01-06 RX ORDER — ESTRADIOL 0.04 MG/D
FILM, EXTENDED RELEASE TRANSDERMAL
Qty: 8 PATCH | Refills: 0 | Status: SHIPPED | OUTPATIENT
Start: 2025-01-06

## 2025-01-06 NOTE — TELEPHONE ENCOUNTER
Patient called Rx line and requested a refill for her Estradiol 0.0375 mg/24 hr patch. Last ov 4/23/2024.  Pharmacy: [facility]

## 2025-01-07 ENCOUNTER — PATIENT MESSAGE (OUTPATIENT)
Dept: PRIMARY CARE | Facility: CLINIC | Age: 70
End: 2025-01-07

## 2025-03-10 DIAGNOSIS — Z78.0 POSTMENOPAUSAL STATUS: ICD-10-CM

## 2025-03-10 DIAGNOSIS — N95.1 MENOPAUSAL SYMPTOM: ICD-10-CM

## 2025-03-10 RX ORDER — ESTRADIOL 0.04 MG/D
FILM, EXTENDED RELEASE TRANSDERMAL
Qty: 8 PATCH | Refills: 0 | Status: SHIPPED | OUTPATIENT
Start: 2025-03-10

## 2025-03-11 NOTE — TELEPHONE ENCOUNTER
Called pt she will be traveling a lot so she does not now her schedule  mahad need to call back but needs RX filled

## 2025-04-05 DIAGNOSIS — F10.21 CHRONIC ALCOHOLISM IN REMISSION (MULTI): ICD-10-CM

## 2025-04-07 RX ORDER — BUPROPION HYDROCHLORIDE 300 MG/1
300 TABLET ORAL DAILY
Qty: 90 TABLET | Refills: 0 | Status: SHIPPED | OUTPATIENT
Start: 2025-04-07

## 2025-04-08 ENCOUNTER — PATIENT MESSAGE (OUTPATIENT)
Dept: PRIMARY CARE | Facility: CLINIC | Age: 70
End: 2025-04-08
Payer: MEDICARE

## 2025-04-29 ENCOUNTER — APPOINTMENT (OUTPATIENT)
Dept: PRIMARY CARE | Facility: CLINIC | Age: 70
End: 2025-04-29
Payer: MEDICARE

## 2025-04-29 VITALS
RESPIRATION RATE: 16 BRPM | DIASTOLIC BLOOD PRESSURE: 62 MMHG | HEART RATE: 70 BPM | SYSTOLIC BLOOD PRESSURE: 100 MMHG | BODY MASS INDEX: 25.16 KG/M2 | OXYGEN SATURATION: 98 % | WEIGHT: 151 LBS | HEIGHT: 65 IN

## 2025-04-29 DIAGNOSIS — R94.31 ABNORMAL EKG: ICD-10-CM

## 2025-04-29 DIAGNOSIS — Z79.899 MEDICATION MANAGEMENT: ICD-10-CM

## 2025-04-29 DIAGNOSIS — E78.5 HYPERLIPIDEMIA, UNSPECIFIED HYPERLIPIDEMIA TYPE: ICD-10-CM

## 2025-04-29 DIAGNOSIS — E55.9 VITAMIN D DEFICIENCY: ICD-10-CM

## 2025-04-29 DIAGNOSIS — E16.2 HYPOGLYCEMIA: ICD-10-CM

## 2025-04-29 DIAGNOSIS — Z12.31 ENCOUNTER FOR SCREENING MAMMOGRAM FOR BREAST CANCER: ICD-10-CM

## 2025-04-29 DIAGNOSIS — Z00.00 ROUTINE GENERAL MEDICAL EXAMINATION AT HEALTH CARE FACILITY: Primary | ICD-10-CM

## 2025-04-29 PROBLEM — N39.0 ACUTE UTI: Status: RESOLVED | Noted: 2023-01-11 | Resolved: 2025-04-29

## 2025-04-29 PROBLEM — H40.9 GLAUCOMA: Status: ACTIVE | Noted: 2025-04-29

## 2025-04-29 PROBLEM — N76.1 SUBACUTE VAGINITIS: Status: ACTIVE | Noted: 2025-04-29

## 2025-04-29 PROBLEM — R43.2 LOSS OF TASTE: Status: RESOLVED | Noted: 2020-11-02 | Resolved: 2025-04-29

## 2025-04-29 PROBLEM — M25.50 ARTHRALGIA OF MULTIPLE JOINTS: Status: ACTIVE | Noted: 2025-04-29

## 2025-04-29 PROBLEM — R53.83 FATIGUE: Status: ACTIVE | Noted: 2020-11-02

## 2025-04-29 PROBLEM — U07.1 COVID-19: Status: RESOLVED | Noted: 2025-04-29 | Resolved: 2025-04-29

## 2025-04-29 PROBLEM — M77.9 ENTHESOPATHY: Status: ACTIVE | Noted: 2025-04-29

## 2025-04-29 PROBLEM — R61 NIGHT SWEATS: Status: ACTIVE | Noted: 2023-04-18

## 2025-04-29 PROBLEM — J34.89 SINUS PRESSURE: Status: ACTIVE | Noted: 2020-11-02

## 2025-04-29 PROBLEM — R19.7 DIARRHEA: Status: RESOLVED | Noted: 2020-11-02 | Resolved: 2025-04-29

## 2025-04-29 PROBLEM — R61 GENERALIZED HYPERHIDROSIS: Status: ACTIVE | Noted: 2023-04-18

## 2025-04-29 PROBLEM — M54.50 LOW BACK PAIN: Status: ACTIVE | Noted: 2019-02-28

## 2025-04-29 PROBLEM — J34.9 DISORDER OF NOSE: Status: ACTIVE | Noted: 2020-11-02

## 2025-04-29 PROBLEM — M19.90 ARTHRITIS: Status: ACTIVE | Noted: 2025-04-29

## 2025-04-29 LAB
POC APPEARANCE, URINE: CLEAR
POC BILIRUBIN, URINE: ABNORMAL
POC BLOOD, URINE: NEGATIVE
POC COLOR, URINE: YELLOW
POC GLUCOSE, URINE: NEGATIVE MG/DL
POC KETONES, URINE: NEGATIVE MG/DL
POC LEUKOCYTES, URINE: NEGATIVE
POC NITRITE,URINE: NEGATIVE
POC PH, URINE: 5.5 PH
POC PROTEIN, URINE: NEGATIVE MG/DL
POC SPECIFIC GRAVITY, URINE: 1.02
POC UROBILINOGEN, URINE: 0.2 EU/DL

## 2025-04-29 PROCEDURE — 1126F AMNT PAIN NOTED NONE PRSNT: CPT | Performed by: PHYSICIAN ASSISTANT

## 2025-04-29 PROCEDURE — 1170F FXNL STATUS ASSESSED: CPT | Performed by: PHYSICIAN ASSISTANT

## 2025-04-29 PROCEDURE — 81003 URINALYSIS AUTO W/O SCOPE: CPT | Performed by: PHYSICIAN ASSISTANT

## 2025-04-29 PROCEDURE — G0439 PPPS, SUBSEQ VISIT: HCPCS | Performed by: PHYSICIAN ASSISTANT

## 2025-04-29 PROCEDURE — 1159F MED LIST DOCD IN RCRD: CPT | Performed by: PHYSICIAN ASSISTANT

## 2025-04-29 PROCEDURE — 3008F BODY MASS INDEX DOCD: CPT | Performed by: PHYSICIAN ASSISTANT

## 2025-04-29 PROCEDURE — 1123F ACP DISCUSS/DSCN MKR DOCD: CPT | Performed by: PHYSICIAN ASSISTANT

## 2025-04-29 PROCEDURE — 93000 ELECTROCARDIOGRAM COMPLETE: CPT | Performed by: PHYSICIAN ASSISTANT

## 2025-04-29 PROCEDURE — 1160F RVW MEDS BY RX/DR IN RCRD: CPT | Performed by: PHYSICIAN ASSISTANT

## 2025-04-29 RX ORDER — LATANOPROST 50 UG/ML
SOLUTION/ DROPS OPHTHALMIC
COMMUNITY
Start: 2025-04-26

## 2025-04-29 ASSESSMENT — ENCOUNTER SYMPTOMS
NAUSEA: 0
FREQUENCY: 0
ACTIVITY CHANGE: 0
DIZZINESS: 0
MYALGIAS: 0
DEPRESSION: 0
NERVOUS/ANXIOUS: 0
DIARRHEA: 0
ARTHRALGIAS: 0
CONSTIPATION: 0
COLOR CHANGE: 0
WHEEZING: 0
BLOOD IN STOOL: 0
TREMORS: 0
APPETITE CHANGE: 0
LOSS OF SENSATION IN FEET: 0
LIGHT-HEADEDNESS: 0
SHORTNESS OF BREATH: 0
CHEST TIGHTNESS: 0
PALPITATIONS: 0
OCCASIONAL FEELINGS OF UNSTEADINESS: 0
ABDOMINAL PAIN: 0

## 2025-04-29 ASSESSMENT — PATIENT HEALTH QUESTIONNAIRE - PHQ9
SUM OF ALL RESPONSES TO PHQ9 QUESTIONS 1 AND 2: 0
2. FEELING DOWN, DEPRESSED OR HOPELESS: NOT AT ALL
1. LITTLE INTEREST OR PLEASURE IN DOING THINGS: NOT AT ALL

## 2025-04-29 ASSESSMENT — PAIN SCALES - GENERAL: PAINLEVEL_OUTOF10: 0-NO PAIN

## 2025-04-29 ASSESSMENT — ACTIVITIES OF DAILY LIVING (ADL)
GROCERY_SHOPPING: INDEPENDENT
TAKING_MEDICATION: INDEPENDENT
DOING_HOUSEWORK: INDEPENDENT
MANAGING_FINANCES: INDEPENDENT
DRESSING: INDEPENDENT
BATHING: INDEPENDENT

## 2025-04-29 NOTE — ASSESSMENT & PLAN NOTE
Orders:    ECG 12 lead (Clinic Performed)    POCT UA Automated manually resulted    CBC and Auto Differential; Future    Comprehensive Metabolic Panel; Future    Lipid Panel; Future    TSH with reflex to Free T4 if abnormal; Future    T3, Reverse; Future    Thyroid Peroxidase (TPO) Antibody; Future

## 2025-04-29 NOTE — PROGRESS NOTES
"Subjective   Reason for Visit: Cynthia Vidallilly Davis is an 69 y.o. female here for a Medicare Wellness visit.     Past Medical, Surgical, and Family History reviewed and updated in chart.    Reviewed all medications by prescribing practitioner or clinical pharmacist (such as prescriptions, OTCs, herbal therapies and supplements) and documented in the medical record.    HPI  Using the patch once a week. Helps with anxiety.  Has her grandson who needs a lot of help and guidance regarding all aspects of life.  His biological mother does not really help very much.  Patient Care Team:  Cynthia Garcia PA-C as PCP - General (Family Medicine)     Review of Systems   Constitutional:  Positive for fatigue. Negative for activity change and appetite change.   HENT:  Negative for dental problem.    Eyes:  Negative for visual disturbance.   Respiratory:  Negative for chest tightness, shortness of breath and wheezing.    Cardiovascular:  Negative for chest pain, palpitations and leg swelling.   Gastrointestinal:  Negative for abdominal pain, blood in stool, constipation, diarrhea and nausea.   Endocrine: Negative for cold intolerance and heat intolerance.   Genitourinary:  Negative for frequency and urgency.   Musculoskeletal:  Negative for arthralgias and myalgias.   Skin:  Negative for color change.   Allergic/Immunologic: Negative for immunocompromised state.   Neurological:  Negative for dizziness, tremors and light-headedness.   Psychiatric/Behavioral:  Positive for sleep disturbance. Negative for behavioral problems. The patient is not nervous/anxious.        Objective   Vitals:  /62 (BP Location: Left arm, Patient Position: Sitting, BP Cuff Size: Large adult)   Pulse 70   Resp 16   Ht 1.651 m (5' 5\")   Wt 68.5 kg (151 lb)   SpO2 98%   BMI 25.13 kg/m²       Physical Exam  HENT:      Right Ear: Tympanic membrane normal.      Left Ear: Tympanic membrane normal.      Nose: Nose normal.      " Mouth/Throat:      Mouth: Mucous membranes are moist.   Eyes:      Extraocular Movements: Extraocular movements intact.      Pupils: Pupils are equal, round, and reactive to light.   Neck:      Thyroid: No thyromegaly.      Vascular: No carotid bruit.   Cardiovascular:      Rate and Rhythm: Normal rate and regular rhythm.      Pulses: Normal pulses.   Pulmonary:      Effort: Pulmonary effort is normal. No respiratory distress.   Chest:   Breasts:     Right: No swelling, bleeding or inverted nipple.      Left: No swelling, bleeding or inverted nipple.   Abdominal:      General: Bowel sounds are normal.      Tenderness: There is no abdominal tenderness.   Musculoskeletal:      Cervical back: Normal range of motion. No tenderness.      Right lower leg: No edema.      Left lower leg: No edema.   Skin:     General: Skin is warm.   Neurological:      General: No focal deficit present.      Mental Status: She is alert. Mental status is at baseline.   Psychiatric:         Mood and Affect: Mood normal.         Thought Content: Thought content normal.         Judgment: Judgment normal.       Patient states she gets shaky around lunchtime and has to eat or drink something.  It only happens at lunch.  She does drink a cup and a half of black tea in the morning.  Does not happen any other time of day.  Did suggest decreasing her tea intake to see if that is actually causing her shakiness.  Assessment & Plan  Encounter for screening mammogram for breast cancer    Orders:    BI mammo bilateral screening tomosynthesis; Future    Hyperlipidemia, unspecified hyperlipidemia type    Orders:    ECG 12 lead (Clinic Performed)    POCT UA Automated manually resulted    CBC and Auto Differential; Future    Comprehensive Metabolic Panel; Future    Lipid Panel; Future    TSH with reflex to Free T4 if abnormal; Future    T3, Reverse; Future    Thyroid Peroxidase (TPO) Antibody; Future    Medication management         Routine general medical  examination at health care facility    Orders:    1 Year Follow Up In Primary Care - Wellness Exam; Future    Hypoglycemia  We will check an A1c and insulin  Orders:    [facility] INSULIN; Future    Hemoglobin A1C; Future    Abnormal EKG  There is no old EKG unfortunately.  Will obtain a stress echo.  Orders:    Echocardiogram Stress Test; Future    Vitamin D deficiency

## 2025-05-02 ASSESSMENT — ENCOUNTER SYMPTOMS
SLEEP DISTURBANCE: 1
FATIGUE: 1

## 2025-05-02 NOTE — ASSESSMENT & PLAN NOTE
Orders:  •  ECG 12 lead (Clinic Performed)  •  POCT UA Automated manually resulted  •  CBC and Auto Differential; Future  •  Comprehensive Metabolic Panel; Future  •  Lipid Panel; Future  •  TSH with reflex to Free T4 if abnormal; Future  •  T3, Reverse; Future  •  Thyroid Peroxidase (TPO) Antibody; Future

## 2025-05-02 NOTE — PROGRESS NOTES
"Subjective   Reason for Visit: Cynthia Davis is an 69 y.o. female here for a Medicare Wellness visit.     Past Medical, Surgical, and Family History reviewed and updated in chart.         HPI    Patient Care Team:  Cynthia Garcia PA-C as PCP - General (Family Medicine)     Review of Systems    Objective   Vitals:  /62 (BP Location: Left arm, Patient Position: Sitting, BP Cuff Size: Large adult)   Pulse 70   Resp 16   Ht 1.651 m (5' 5\")   Wt 68.5 kg (151 lb)   SpO2 98%   BMI 25.13 kg/m²       Physical Exam    Assessment & Plan  Encounter for screening mammogram for breast cancer    Orders:  •  BI mammo bilateral screening tomosynthesis; Future    Hyperlipidemia, unspecified hyperlipidemia type    Orders:  •  ECG 12 lead (Clinic Performed)  •  POCT UA Automated manually resulted  •  CBC and Auto Differential; Future  •  Comprehensive Metabolic Panel; Future  •  Lipid Panel; Future  •  TSH with reflex to Free T4 if abnormal; Future  •  T3, Reverse; Future  •  Thyroid Peroxidase (TPO) Antibody; Future    Medication management         Routine general medical examination at health care facility    Orders:  •  1 Year Follow Up In Primary Care - Wellness Exam; Future  •  1 Year Follow Up In Primary Care - Wellness Exam; Future    Hypoglycemia    Orders:  •  [facility] INSULIN; Future  •  Hemoglobin A1C; Future    Abnormal EKG    Orders:  •  Echocardiogram Stress Test; Future    Vitamin D deficiency                   "

## 2025-05-03 LAB
ALBUMIN SERPL-MCNC: 4.5 G/DL (ref 3.6–5.1)
ALP SERPL-CCNC: 65 U/L (ref 37–153)
ALT SERPL-CCNC: 13 U/L (ref 6–29)
ANION GAP SERPL CALCULATED.4IONS-SCNC: 8 MMOL/L (CALC) (ref 7–17)
AST SERPL-CCNC: 15 U/L (ref 10–35)
BASOPHILS # BLD AUTO: 40 CELLS/UL (ref 0–200)
BASOPHILS NFR BLD AUTO: 0.8 %
BILIRUB SERPL-MCNC: 0.5 MG/DL (ref 0.2–1.2)
BUN SERPL-MCNC: 23 MG/DL (ref 7–25)
CALCIUM SERPL-MCNC: 9.4 MG/DL (ref 8.6–10.4)
CHLORIDE SERPL-SCNC: 104 MMOL/L (ref 98–110)
CHOLEST SERPL-MCNC: 232 MG/DL
CHOLEST/HDLC SERPL: 3.2 (CALC)
CO2 SERPL-SCNC: 28 MMOL/L (ref 20–32)
CREAT SERPL-MCNC: 0.75 MG/DL (ref 0.5–1.05)
EGFRCR SERPLBLD CKD-EPI 2021: 87 ML/MIN/1.73M2
EOSINOPHIL # BLD AUTO: 100 CELLS/UL (ref 15–500)
EOSINOPHIL NFR BLD AUTO: 2 %
ERYTHROCYTE [DISTWIDTH] IN BLOOD BY AUTOMATED COUNT: 12.7 % (ref 11–15)
EST. AVERAGE GLUCOSE BLD GHB EST-MCNC: 117 MG/DL
EST. AVERAGE GLUCOSE BLD GHB EST-SCNC: 6.5 MMOL/L
GLUCOSE SERPL-MCNC: 97 MG/DL (ref 65–99)
HBA1C MFR BLD: 5.7 %
HCT VFR BLD AUTO: 39.1 % (ref 35–45)
HDLC SERPL-MCNC: 72 MG/DL
HGB BLD-MCNC: 13.4 G/DL (ref 11.7–15.5)
LDLC SERPL CALC-MCNC: 142 MG/DL (CALC)
LYMPHOCYTES # BLD AUTO: 2195 CELLS/UL (ref 850–3900)
LYMPHOCYTES NFR BLD AUTO: 43.9 %
MCH RBC QN AUTO: 33.3 PG (ref 27–33)
MCHC RBC AUTO-ENTMCNC: 34.3 G/DL (ref 32–36)
MCV RBC AUTO: 97.3 FL (ref 80–100)
MONOCYTES # BLD AUTO: 420 CELLS/UL (ref 200–950)
MONOCYTES NFR BLD AUTO: 8.4 %
NEUTROPHILS # BLD AUTO: 2245 CELLS/UL (ref 1500–7800)
NEUTROPHILS NFR BLD AUTO: 44.9 %
NONHDLC SERPL-MCNC: 160 MG/DL (CALC)
PLATELET # BLD AUTO: 194 THOUSAND/UL (ref 140–400)
PMV BLD REES-ECKER: 10.1 FL (ref 7.5–12.5)
POTASSIUM SERPL-SCNC: 4.9 MMOL/L (ref 3.5–5.3)
PROT SERPL-MCNC: 6.4 G/DL (ref 6.1–8.1)
RBC # BLD AUTO: 4.02 MILLION/UL (ref 3.8–5.1)
SODIUM SERPL-SCNC: 140 MMOL/L (ref 135–146)
T3REVERSE SERPL-MCNC: NORMAL PG/ML
THYROPEROXIDASE AB SERPL-ACNC: NORMAL [IU]/ML
TRIGL SERPL-MCNC: 76 MG/DL
TSH SERPL-ACNC: 4.12 MIU/L (ref 0.4–4.5)
WBC # BLD AUTO: 5 THOUSAND/UL (ref 3.8–10.8)

## 2025-05-05 LAB — INSULIN SERPL-ACNC: 7.8 UIU/ML

## 2025-05-06 LAB
ALBUMIN SERPL-MCNC: 4.5 G/DL (ref 3.6–5.1)
ALP SERPL-CCNC: 65 U/L (ref 37–153)
ALT SERPL-CCNC: 13 U/L (ref 6–29)
ANION GAP SERPL CALCULATED.4IONS-SCNC: 8 MMOL/L (CALC) (ref 7–17)
AST SERPL-CCNC: 15 U/L (ref 10–35)
BASOPHILS # BLD AUTO: 40 CELLS/UL (ref 0–200)
BASOPHILS NFR BLD AUTO: 0.8 %
BILIRUB SERPL-MCNC: 0.5 MG/DL (ref 0.2–1.2)
BUN SERPL-MCNC: 23 MG/DL (ref 7–25)
CALCIUM SERPL-MCNC: 9.4 MG/DL (ref 8.6–10.4)
CHLORIDE SERPL-SCNC: 104 MMOL/L (ref 98–110)
CHOLEST SERPL-MCNC: 232 MG/DL
CHOLEST/HDLC SERPL: 3.2 (CALC)
CO2 SERPL-SCNC: 28 MMOL/L (ref 20–32)
CREAT SERPL-MCNC: 0.75 MG/DL (ref 0.5–1.05)
EGFRCR SERPLBLD CKD-EPI 2021: 87 ML/MIN/1.73M2
EOSINOPHIL # BLD AUTO: 100 CELLS/UL (ref 15–500)
EOSINOPHIL NFR BLD AUTO: 2 %
ERYTHROCYTE [DISTWIDTH] IN BLOOD BY AUTOMATED COUNT: 12.7 % (ref 11–15)
EST. AVERAGE GLUCOSE BLD GHB EST-MCNC: 117 MG/DL
EST. AVERAGE GLUCOSE BLD GHB EST-SCNC: 6.5 MMOL/L
GLUCOSE SERPL-MCNC: 97 MG/DL (ref 65–99)
HBA1C MFR BLD: 5.7 %
HCT VFR BLD AUTO: 39.1 % (ref 35–45)
HDLC SERPL-MCNC: 72 MG/DL
HGB BLD-MCNC: 13.4 G/DL (ref 11.7–15.5)
LDLC SERPL CALC-MCNC: 142 MG/DL (CALC)
LYMPHOCYTES # BLD AUTO: 2195 CELLS/UL (ref 850–3900)
LYMPHOCYTES NFR BLD AUTO: 43.9 %
MCH RBC QN AUTO: 33.3 PG (ref 27–33)
MCHC RBC AUTO-ENTMCNC: 34.3 G/DL (ref 32–36)
MCV RBC AUTO: 97.3 FL (ref 80–100)
MONOCYTES # BLD AUTO: 420 CELLS/UL (ref 200–950)
MONOCYTES NFR BLD AUTO: 8.4 %
NEUTROPHILS # BLD AUTO: 2245 CELLS/UL (ref 1500–7800)
NEUTROPHILS NFR BLD AUTO: 44.9 %
NONHDLC SERPL-MCNC: 160 MG/DL (CALC)
PLATELET # BLD AUTO: 194 THOUSAND/UL (ref 140–400)
PMV BLD REES-ECKER: 10.1 FL (ref 7.5–12.5)
POTASSIUM SERPL-SCNC: 4.9 MMOL/L (ref 3.5–5.3)
PROT SERPL-MCNC: 6.4 G/DL (ref 6.1–8.1)
RBC # BLD AUTO: 4.02 MILLION/UL (ref 3.8–5.1)
SODIUM SERPL-SCNC: 140 MMOL/L (ref 135–146)
T3REVERSE SERPL-MCNC: 8 NG/DL (ref 8–25)
THYROPEROXIDASE AB SERPL-ACNC: 2 IU/ML
TRIGL SERPL-MCNC: 76 MG/DL
TSH SERPL-ACNC: 4.12 MIU/L (ref 0.4–4.5)
WBC # BLD AUTO: 5 THOUSAND/UL (ref 3.8–10.8)

## 2025-05-08 ENCOUNTER — PATIENT MESSAGE (OUTPATIENT)
Dept: PRIMARY CARE | Facility: CLINIC | Age: 70
End: 2025-05-08
Payer: MEDICARE

## 2025-05-08 DIAGNOSIS — Z78.0 POSTMENOPAUSAL STATUS: ICD-10-CM

## 2025-05-08 DIAGNOSIS — N95.1 MENOPAUSAL SYMPTOM: ICD-10-CM

## 2025-05-08 DIAGNOSIS — F10.21 CHRONIC ALCOHOLISM IN REMISSION (MULTI): ICD-10-CM

## 2025-05-08 DIAGNOSIS — F32.89 OTHER DEPRESSION: Primary | ICD-10-CM

## 2025-05-13 ENCOUNTER — APPOINTMENT (OUTPATIENT)
Dept: RADIOLOGY | Facility: CLINIC | Age: 70
End: 2025-05-13
Payer: MEDICARE

## 2025-05-13 VITALS — BODY MASS INDEX: 25.16 KG/M2 | HEIGHT: 65 IN | WEIGHT: 151 LBS

## 2025-05-13 DIAGNOSIS — Z12.31 ENCOUNTER FOR SCREENING MAMMOGRAM FOR BREAST CANCER: ICD-10-CM

## 2025-05-13 DIAGNOSIS — Z78.0 POSTMENOPAUSAL STATUS: ICD-10-CM

## 2025-05-13 DIAGNOSIS — N95.1 MENOPAUSAL SYMPTOM: ICD-10-CM

## 2025-05-13 PROCEDURE — 77063 BREAST TOMOSYNTHESIS BI: CPT | Performed by: RADIOLOGY

## 2025-05-13 PROCEDURE — 77067 SCR MAMMO BI INCL CAD: CPT | Performed by: RADIOLOGY

## 2025-05-13 PROCEDURE — 77063 BREAST TOMOSYNTHESIS BI: CPT

## 2025-05-13 RX ORDER — BUPROPION HYDROCHLORIDE 300 MG/1
300 TABLET ORAL DAILY
Qty: 90 TABLET | Refills: 3 | Status: SHIPPED | OUTPATIENT
Start: 2025-05-13

## 2025-05-13 RX ORDER — ESTRADIOL 0.04 MG/D
FILM, EXTENDED RELEASE TRANSDERMAL
Qty: 8 PATCH | Refills: 3 | Status: SHIPPED | OUTPATIENT
Start: 2025-05-13

## 2025-06-06 ENCOUNTER — HOSPITAL ENCOUNTER (OUTPATIENT)
Dept: CARDIOLOGY | Facility: CLINIC | Age: 70
Discharge: HOME | End: 2025-06-06
Payer: MEDICARE

## 2025-06-06 DIAGNOSIS — E78.5 HYPERLIPIDEMIA, UNSPECIFIED HYPERLIPIDEMIA TYPE: Primary | ICD-10-CM

## 2025-06-06 DIAGNOSIS — R94.31 ABNORMAL EKG: ICD-10-CM

## 2025-06-06 PROCEDURE — 93350 STRESS TTE ONLY: CPT | Performed by: INTERNAL MEDICINE

## 2025-06-06 PROCEDURE — 93018 CV STRESS TEST I&R ONLY: CPT | Performed by: INTERNAL MEDICINE

## 2025-06-06 PROCEDURE — 93017 CV STRESS TEST TRACING ONLY: CPT

## 2025-06-06 PROCEDURE — 93016 CV STRESS TEST SUPVJ ONLY: CPT | Performed by: INTERNAL MEDICINE

## 2025-06-11 ENCOUNTER — HOSPITAL ENCOUNTER (OUTPATIENT)
Dept: RADIOLOGY | Facility: CLINIC | Age: 70
Discharge: HOME | End: 2025-06-11
Payer: MEDICARE

## 2025-06-11 DIAGNOSIS — E78.5 HYPERLIPIDEMIA, UNSPECIFIED HYPERLIPIDEMIA TYPE: ICD-10-CM

## 2025-06-11 PROCEDURE — 75571 CT HRT W/O DYE W/CA TEST: CPT
